# Patient Record
Sex: FEMALE | Race: BLACK OR AFRICAN AMERICAN | NOT HISPANIC OR LATINO | URBAN - METROPOLITAN AREA
[De-identification: names, ages, dates, MRNs, and addresses within clinical notes are randomized per-mention and may not be internally consistent; named-entity substitution may affect disease eponyms.]

---

## 2021-04-01 ENCOUNTER — OFFICE (OUTPATIENT)
Dept: URBAN - METROPOLITAN AREA CLINIC 102 | Facility: CLINIC | Age: 76
End: 2021-04-01

## 2021-04-01 VITALS
TEMPERATURE: 97.9 F | HEART RATE: 61 BPM | WEIGHT: 256 LBS | SYSTOLIC BLOOD PRESSURE: 132 MMHG | HEIGHT: 71 IN | DIASTOLIC BLOOD PRESSURE: 78 MMHG

## 2021-04-01 DIAGNOSIS — D50.9 IRON DEFICIENCY ANEMIA, UNSPECIFIED: ICD-10-CM

## 2021-04-01 PROCEDURE — 00031: CPT | Performed by: INTERNAL MEDICINE

## 2021-04-01 PROCEDURE — 99244 OFF/OP CNSLTJ NEW/EST MOD 40: CPT

## 2021-04-01 NOTE — SERVICEHPINOTES
JOSÉ MIGUEL DIAZ   is a   75   year old    female who is being seen in consultation at the request of   ALISTAIR JOHNSON   for iron def anemia. Pt states she had anemia years ago (20+ years ago) and took iron at that time but no recent issues. Was found to have iron def anemia in December and has been following with Dr Johnson (see labs below). She has been taking BID PO iron. She did also have a bone marrow biopsy. Getting repeat labs today and f/u appt with hematology next week. BRShe denies any GI issues/symptoms. No rectal bleeding or melena. BMs are regular. Denies constipation, diarrhea, abd pain, GERD sx, n/v, or dysphagia. 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 No prior EGD. BRHer sister had throat cancer mother had ovarian cancer, and brother had lymphoma. 12/3/20 hgb 7.2, MCV 63, iron sat 4%, ferritin 14. BR1/5/21 hgb 8.2, MCV 67, iron sat 23.4%, normal CMP